# Patient Record
Sex: MALE | Race: WHITE
[De-identification: names, ages, dates, MRNs, and addresses within clinical notes are randomized per-mention and may not be internally consistent; named-entity substitution may affect disease eponyms.]

---

## 2018-11-28 ENCOUNTER — HOSPITAL ENCOUNTER (OUTPATIENT)
Dept: HOSPITAL 44 - ENT | Age: 58
End: 2018-11-28
Attending: OTOLARYNGOLOGY
Payer: COMMERCIAL

## 2018-11-28 DIAGNOSIS — R04.0: Primary | ICD-10-CM

## 2018-11-28 PROCEDURE — 30901 CONTROL OF NOSEBLEED: CPT

## 2018-11-28 PROCEDURE — 99202 OFFICE O/P NEW SF 15 MIN: CPT

## 2018-11-30 NOTE — OP CLINIC PROGRESS NOTE
REASON FOR VISIT: 

This 58-year-old male has a history of intermittent but fairly persistent 
epistaxis from his left side.  He is seen accompanied by his wife.  



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 
left side.  With the patient's permission and going over risks, problems, and 
complications, I went ahead and cauterized that under a microscope with silver 
nitrate.  He tolerated this very well. 



PLAN: 

I showed him how to keep an antibiotic impregnated cotton ball over that site 
for at least 2 weeks, and I will check him back for healing and the possibility 
of nasal septal perforation would be there particularly if he is not well cared 
for.  He acknowledges understanding. 





cc:   Dr. Morteza ELLIOTT

## 2018-12-19 ENCOUNTER — HOSPITAL ENCOUNTER (OUTPATIENT)
Dept: HOSPITAL 44 - ENT | Age: 58
Discharge: HOME | End: 2018-12-19
Attending: OTOLARYNGOLOGY
Payer: COMMERCIAL

## 2018-12-19 DIAGNOSIS — R04.0: Primary | ICD-10-CM

## 2018-12-19 PROCEDURE — 99212 OFFICE O/P EST SF 10 MIN: CPT

## 2018-12-20 NOTE — OP CLINIC PROGRESS NOTE
REASON FOR VISIT: 

This 58-year-old male has had a left-sided epistaxis that was cauterized on 
November 28, 2018.  He has kept antibiotic ointment on it and covered it with 
cotton two-thirds of the day in the interim.  He takes it out at night. 



Most of the area of cautery has pretty well healed.  He still has maybe a 3 mm 
to 4 mm area that has yellow crusty debris on it.  



PLAN: 

Although he has had a fair amount of progress with the healing of the cautery 
site and there is no perforation, I still think he needs to keep cotton in his 
nose with antibiotic ointment for the next 2 weeks and I will simply recheck 
him.  He understands 24/7 of keeping the medicine there is a lot better.  He 
just has an extremely difficult time tolerating this when he goes to sleep at 
night.  I will see him back in 2 weeks. 





cc:  Dr. Morteza ELLIOTT